# Patient Record
Sex: MALE | Race: ASIAN | HISPANIC OR LATINO
[De-identification: names, ages, dates, MRNs, and addresses within clinical notes are randomized per-mention and may not be internally consistent; named-entity substitution may affect disease eponyms.]

---

## 2021-01-08 PROBLEM — Z00.00 ENCOUNTER FOR PREVENTIVE HEALTH EXAMINATION: Status: ACTIVE | Noted: 2021-01-08

## 2021-01-11 ENCOUNTER — APPOINTMENT (OUTPATIENT)
Dept: UROLOGY | Facility: CLINIC | Age: 66
End: 2021-01-11

## 2021-04-02 ENCOUNTER — APPOINTMENT (OUTPATIENT)
Dept: UROLOGY | Facility: CLINIC | Age: 66
End: 2021-04-02
Payer: COMMERCIAL

## 2021-04-02 VITALS
WEIGHT: 195 LBS | SYSTOLIC BLOOD PRESSURE: 144 MMHG | HEIGHT: 65 IN | TEMPERATURE: 97.3 F | DIASTOLIC BLOOD PRESSURE: 79 MMHG | HEART RATE: 56 BPM | BODY MASS INDEX: 32.49 KG/M2

## 2021-04-02 DIAGNOSIS — Z86.79 PERSONAL HISTORY OF OTHER DISEASES OF THE CIRCULATORY SYSTEM: ICD-10-CM

## 2021-04-02 DIAGNOSIS — Z78.9 OTHER SPECIFIED HEALTH STATUS: ICD-10-CM

## 2021-04-02 LAB
BILIRUB UR QL STRIP: NEGATIVE
CLARITY UR: CLEAR
COLLECTION METHOD: NORMAL
GLUCOSE UR-MCNC: NEGATIVE
HCG UR QL: 0.2 EU/DL
HGB UR QL STRIP.AUTO: NEGATIVE
KETONES UR-MCNC: NEGATIVE
LEUKOCYTE ESTERASE UR QL STRIP: NEGATIVE
NITRITE UR QL STRIP: NEGATIVE
PH UR STRIP: 7
PROT UR STRIP-MCNC: NEGATIVE
SP GR UR STRIP: 1.02

## 2021-04-02 PROCEDURE — 99072 ADDL SUPL MATRL&STAF TM PHE: CPT

## 2021-04-02 PROCEDURE — 99204 OFFICE O/P NEW MOD 45 MIN: CPT

## 2021-04-02 PROCEDURE — 81003 URINALYSIS AUTO W/O SCOPE: CPT | Mod: QW

## 2021-04-02 RX ORDER — ATENOLOL 25 MG/1
25 TABLET ORAL
Refills: 0 | Status: ACTIVE | COMMUNITY

## 2021-04-02 RX ORDER — PANTOPRAZOLE 20 MG/1
20 TABLET, DELAYED RELEASE ORAL
Refills: 0 | Status: ACTIVE | COMMUNITY

## 2021-04-02 RX ORDER — IRBESARTAN AND HYDROCHLOROTHIAZIDE 300; 12.5 MG/1; MG/1
TABLET, FILM COATED ORAL
Refills: 0 | Status: ACTIVE | COMMUNITY

## 2021-04-02 RX ORDER — AMLODIPINE BESYLATE 5 MG/1
TABLET ORAL
Refills: 0 | Status: ACTIVE | COMMUNITY

## 2021-04-02 NOTE — HISTORY OF PRESENT ILLNESS
[FreeTextEntry1] : Mr. RIC JEAN 65 year old  M  PMH HTN, GERD and PSH cholecystectomy. Pt comes in for ED and frequent urination. Rates erection 2-3/10. Pt feels issue is more with getting an erection. Nocturia x3. Stream is ok. Some intermittency. Has urinary urgency. Denies [facility] of cancer. Stopped smoking 1982. Smoked for about 15 years.\par

## 2021-04-02 NOTE — PHYSICAL EXAM
[General Appearance - Well Developed] : well developed [Normal Appearance] : normal appearance [General Appearance - Well Nourished] : well nourished [Well Groomed] : well groomed [General Appearance - In No Acute Distress] : no acute distress [Edema] : no peripheral edema [Respiration, Rhythm And Depth] : normal respiratory rhythm and effort [Exaggerated Use Of Accessory Muscles For Inspiration] : no accessory muscle use [Abdomen Soft] : soft [Abdomen Tenderness] : non-tender [Costovertebral Angle Tenderness] : no ~M costovertebral angle tenderness [Urethral Meatus] : meatus normal [Penis Abnormality] : normal circumcised penis [Urinary Bladder Findings] : the bladder was normal on palpation [Scrotum] : the scrotum was normal [Testes Mass (___cm)] : there were no testicular masses [Prostate Tenderness] : the prostate was not tender [No Prostate Nodules] : no prostate nodules [Prostate Size ___ gm] : prostate size [unfilled] gm [Normal Station and Gait] : the gait and station were normal for the patient's age [] : no rash [No Focal Deficits] : no focal deficits [Affect] : the affect was normal [Oriented To Time, Place, And Person] : oriented to person, place, and time [Mood] : the mood was normal [Not Anxious] : not anxious

## 2021-04-02 NOTE — LETTER BODY
[Dear  ___] : Dear  [unfilled], [Consult Letter:] : I had the pleasure of evaluating your patient, [unfilled]. [Please see my note below.] : Please see my note below. [Consult Closing:] : Thank you very much for allowing me to participate in the care of this patient.  If you have any questions, please do not hesitate to contact me. [Sincerely,] : Sincerely, [FreeTextEntry3] : Charles Jones, DO\par Genitourinary Medicine\par

## 2021-04-02 NOTE — REVIEW OF SYSTEMS
[Poor quality erections] : Poor quality erections [Wake up at night to urinate  How many times?  ___] : wakes up to urinate [unfilled] times during the night [Negative] : Heme/Lymph [see HPI] : see HPI

## 2021-05-12 ENCOUNTER — APPOINTMENT (OUTPATIENT)
Dept: UROLOGY | Facility: CLINIC | Age: 66
End: 2021-05-12

## 2021-05-25 ENCOUNTER — RX RENEWAL (OUTPATIENT)
Age: 66
End: 2021-05-25

## 2021-07-08 ENCOUNTER — RX RENEWAL (OUTPATIENT)
Age: 66
End: 2021-07-08

## 2021-07-14 ENCOUNTER — APPOINTMENT (OUTPATIENT)
Dept: UROLOGY | Facility: CLINIC | Age: 66
End: 2021-07-14
Payer: COMMERCIAL

## 2021-07-14 VITALS
TEMPERATURE: 97.6 F | WEIGHT: 195 LBS | SYSTOLIC BLOOD PRESSURE: 151 MMHG | BODY MASS INDEX: 32.49 KG/M2 | HEART RATE: 56 BPM | DIASTOLIC BLOOD PRESSURE: 91 MMHG | HEIGHT: 65 IN

## 2021-07-14 PROCEDURE — 99214 OFFICE O/P EST MOD 30 MIN: CPT

## 2021-07-14 PROCEDURE — 99072 ADDL SUPL MATRL&STAF TM PHE: CPT

## 2021-07-14 RX ORDER — TAMSULOSIN HYDROCHLORIDE 0.4 MG/1
0.4 CAPSULE ORAL
Qty: 30 | Refills: 1 | Status: ACTIVE | COMMUNITY
Start: 2021-07-14 | End: 1900-01-01

## 2021-07-14 NOTE — ASSESSMENT
[FreeTextEntry1] : Plan\par stop cialis 5mg daily and try cialis 10mg PRN erections and if that doesn't work try 20mg PRN erections\par start flomax\par repeat PSA\par fu 2 weeks

## 2021-07-14 NOTE — HISTORY OF PRESENT ILLNESS
[FreeTextEntry1] : Pt comes in follow up LUTS and ED. He feels the cialis 5 mg did not help much. \par \par 6/2/21 PSA 5.43

## 2021-07-28 ENCOUNTER — APPOINTMENT (OUTPATIENT)
Dept: UROLOGY | Facility: CLINIC | Age: 66
End: 2021-07-28
Payer: COMMERCIAL

## 2021-07-28 VITALS
SYSTOLIC BLOOD PRESSURE: 168 MMHG | WEIGHT: 195 LBS | DIASTOLIC BLOOD PRESSURE: 85 MMHG | BODY MASS INDEX: 32.49 KG/M2 | HEART RATE: 61 BPM | HEIGHT: 65 IN | TEMPERATURE: 97.8 F

## 2021-07-28 DIAGNOSIS — N52.9 MALE ERECTILE DYSFUNCTION, UNSPECIFIED: ICD-10-CM

## 2021-07-28 DIAGNOSIS — R39.9 UNSPECIFIED SYMPTOMS AND SIGNS INVOLVING THE GENITOURINARY SYSTEM: ICD-10-CM

## 2021-07-28 DIAGNOSIS — R97.20 ELEVATED PROSTATE, SPECIFIC ANTIGEN [PSA]: ICD-10-CM

## 2021-07-28 LAB
PSA FREE FLD-MCNC: 15 %
PSA FREE SERPL-MCNC: 1.07 NG/ML
PSA SERPL-MCNC: 7.38 NG/ML

## 2021-07-28 PROCEDURE — 99214 OFFICE O/P EST MOD 30 MIN: CPT

## 2021-07-28 PROCEDURE — 99072 ADDL SUPL MATRL&STAF TM PHE: CPT

## 2021-07-28 RX ORDER — TADALAFIL 5 MG/1
5 TABLET ORAL
Qty: 30 | Refills: 0 | Status: COMPLETED | COMMUNITY
Start: 2021-04-02 | End: 2021-07-28

## 2021-07-28 RX ORDER — TADALAFIL 10 MG/1
10 TABLET, FILM COATED ORAL
Qty: 15 | Refills: 1 | Status: ACTIVE | COMMUNITY
Start: 2021-07-14 | End: 1900-01-01

## 2021-07-28 NOTE — HISTORY OF PRESENT ILLNESS
[FreeTextEntry1] : Pt comes in follow up LUTS and ED. He feels the cialis 5 mg did not help much but after changing to 10mg PRN this has helped him. The flomax has helped him as well.\par \par 6/2/21 PSA 5.43\par 7/28/21 PSA 7.38 %free 15

## 2021-07-28 NOTE — ASSESSMENT
[FreeTextEntry1] : Plan\par UA\par culture\par c/w cialis 10mg PRN erections \par c/w flomax\par MRI prostate\par fu 2 weeks

## 2021-07-29 LAB
APPEARANCE: CLEAR
BACTERIA: NEGATIVE
BILIRUBIN URINE: NEGATIVE
BLOOD URINE: NEGATIVE
COLOR: NORMAL
GLUCOSE QUALITATIVE U: NEGATIVE
HYALINE CASTS: 0 /LPF
KETONES URINE: NEGATIVE
LEUKOCYTE ESTERASE URINE: NEGATIVE
MICROSCOPIC-UA: NORMAL
NITRITE URINE: NEGATIVE
PH URINE: 7
PROTEIN URINE: NEGATIVE
RED BLOOD CELLS URINE: 1 /HPF
SPECIFIC GRAVITY URINE: 1.01
SQUAMOUS EPITHELIAL CELLS: 0 /HPF
UROBILINOGEN URINE: NORMAL
WHITE BLOOD CELLS URINE: 0 /HPF

## 2021-08-09 LAB — BACTERIA UR CULT: NORMAL

## 2021-09-14 ENCOUNTER — RX RENEWAL (OUTPATIENT)
Age: 66
End: 2021-09-14

## 2023-06-05 ENCOUNTER — FORM ENCOUNTER (OUTPATIENT)
Age: 68
End: 2023-06-05

## 2023-06-08 ENCOUNTER — FORM ENCOUNTER (OUTPATIENT)
Age: 68
End: 2023-06-08

## 2023-06-22 ENCOUNTER — APPOINTMENT (OUTPATIENT)
Dept: INFECTIOUS DISEASE | Facility: CLINIC | Age: 68
End: 2023-06-22
Payer: COMMERCIAL

## 2023-06-22 ENCOUNTER — NON-APPOINTMENT (OUTPATIENT)
Age: 68
End: 2023-06-22

## 2023-06-22 VITALS
TEMPERATURE: 98.3 F | OXYGEN SATURATION: 95 % | SYSTOLIC BLOOD PRESSURE: 118 MMHG | HEART RATE: 64 BPM | DIASTOLIC BLOOD PRESSURE: 68 MMHG

## 2023-06-22 DIAGNOSIS — R78.81 BACTEREMIA: ICD-10-CM

## 2023-06-22 DIAGNOSIS — A28.0 PASTEURELLOSIS: ICD-10-CM

## 2023-06-22 PROCEDURE — 99213 OFFICE O/P EST LOW 20 MIN: CPT

## 2023-06-22 NOTE — HISTORY OF PRESENT ILLNESS
[FreeTextEntry1] : Patient is here for hospital follow up \par \par On augmentin # 18 Total Abxs\par \par \par Saw [facility] for a persistent cough \par Augmentin extended for 5 days more and given tessalon perles \par \par Feels well and offers no other complaints

## 2023-06-22 NOTE — PHYSICAL EXAM
[General Appearance - Alert] : alert [General Appearance - In No Acute Distress] : in no acute distress [General Appearance - Well Nourished] : well nourished [Sclera] : the sclera and conjunctiva were normal [Outer Ear] : the ears and nose were normal in appearance [Examination Of The Oral Cavity] : the lips and gums were normal [Neck Appearance] : the appearance of the neck was normal [Respiration, Rhythm And Depth] : normal respiratory rhythm and effort [Heart Rate And Rhythm] : heart rate was normal and rhythm regular [Heart Sounds] : normal S1 and S2 [Edema] : there was no peripheral edema [Bowel Sounds] : normal bowel sounds [Abdomen Soft] : soft [Abdomen Tenderness] : non-tender [] : no rash [FreeTextEntry1] : back sebaceous cyst - no erythema, doesn't appear infected  [Motor Exam] : the motor exam was normal [No Focal Deficits] : no focal deficits [Oriented To Time, Place, And Person] : oriented to person, place, and time [Affect] : the affect was normal

## 2023-06-22 NOTE — REASON FOR VISIT
[Post Hospitalization] : a post hospitalization visit [Family Member] : family member [FreeTextEntry1] : fu from hospital stay ([facility]) for Bacteremia\par continues taking Augmentin 875 BID - c/o diarrhea. also taking Probiotics\par pt has seen Dr. Cox (Oncologist) since discharge

## 2023-06-22 NOTE — ASSESSMENT
[FreeTextEntry1] : 67 y/o man immunosuppressed on chemo for metastatic disease, who was admitted with sepsis with Pasteurella bacteremia - repeat blood cultures with no growth \par \par Rec:\par \par 1. Complete augmentin as scheduled \par 2. Avoid pet saliva exposure\par 3. No need to repeat blood cultures \par \par Plan above d/w patient and daughter  [Treatment Education] : treatment education [Treatment Adherence] : treatment adherence [Risk Reduction] : risk reduction

## 2023-06-22 NOTE — REVIEW OF SYSTEMS
[Cough] : cough [Sputum] : not coughing up ~M sputum [As Noted in HPI] : as noted in HPI [Negative] : Heme/Lymph [de-identified] : sebaceous cyst on his back

## 2025-04-22 ENCOUNTER — APPOINTMENT (OUTPATIENT)
Dept: PULMONOLOGY | Facility: CLINIC | Age: 70
End: 2025-04-22
Payer: COMMERCIAL

## 2025-04-22 PROCEDURE — 94060 EVALUATION OF WHEEZING: CPT | Mod: TC

## 2025-04-22 PROCEDURE — 94729 DIFFUSING CAPACITY: CPT | Mod: TC

## 2025-04-22 PROCEDURE — 94727 GAS DIL/WSHOT DETER LNG VOL: CPT | Mod: TC
